# Patient Record
(demographics unavailable — no encounter records)

---

## 2025-01-06 NOTE — HISTORY OF PRESENT ILLNESS
[FreeTextEntry1] : Mr. Kalpesh Harmon presented to the office today for follow-up cardiac evaluation.  He was last evaluated in our office by Dr. Chow on 6/20/2022.  In the interim, he had been following with Dr. Reyez, however, he decided to return to our office for his cardiac care, noting that I also care for his wife.  The patient is a 70-year-old male with a history of coronary artery disease (status post CABG 12/19/2019, including LIMA to LAD and SVG's to D1 and OM1), hypertension, a dyslipidemia, pre-diabetes, mild tricuspid regurgitation, minimal carotid atherosclerosis, anxiety/depression, and prostate carcinoma (status post prostatectomy in his 40's).  The patient has been doing well from a cardiac symptomatic standpoint since his previous visit here on 6/20/2022.  Specifically, he does not report having experienced chest discomfort or dyspnea on exertion in association with his activities.  He has not noted orthopnea, paroxysmal nocturnal dyspnea, or lower extremity edema.  He has not experienced any episodes of palpitations, presyncope or syncope.  As far as risk factors for coronary artery disease are concerned, the patient has a history of hypertension, a dyslipidemia, and pre-diabetes.  He denies a history of cigarette smoking.  He has a reported family history of atherosclerotic heart disease in his mother.  Laboratory studies performed on 11/15/2023 (on Lipitor 40 mg daily) revealed cholesterol 176, triglycerides 110, HDL 51, and calculated .  The liver chemistries were normal.  The BUN and creatinine was 17 and 1.0, respectively.  The potassium level was 5.2.  The glucose level was 117 and the hemoglobin A1c level was 5.9%.  The hemoglobin and hematocrit were 14.5 and 47.1, respectively.  Cardiac catheterization performed in December 2019 (pre-CABG) revealed an 85% left main stenosis, a 65% right coronary artery stenosis, and a 50% stenosis involving the right posterolateral branch vessel.  Left ventriculography revealed moderate segmental left ventricular systolic dysfunction (EF 45%, however, echocardiography performed at that time revealed the EF to be normal).  Exercise stress testing performed on 11/19/2020 revealed the patient to exhibit diminished exercise tolerance (7 METS), without the inducement of cardiac symptoms, electrocardiographic evidence of myocardial ischemia, or significant arrhythmias.  Echocardiography performed on 6/25/2020 revealed normal cardiac chamber sizes with normal left ventricular wall thickness.  Left ventricular systolic function was normal, with a calculated ejection fraction of 64%.  Normal left ventricular diastolic function was demonstrated.  Mild tricuspid regurgitation was demonstrated, without evidence of pulmonary hypertension.  Carotid artery Doppler testing performed on 12/4/2019 revealed minimal bilateral intimal thickening without any significant stenoses demonstrated.  A 24-hour Holter monitor study performed on 5/6/2020 revealed the predominant rhythm throughout the recording to be sinus rhythm at an average rate of 76 bpm, with a range of 44 to 114 bpm.  Rare supraventricular premature contractions and rare ventricular premature contractions were exhibited.

## 2025-01-06 NOTE — PHYSICAL EXAM
[No Acute Distress] : no acute distress [Normal Conjunctiva] : normal conjunctiva [No Carotid Bruit] : no carotid bruit [Normal S1, S2] : normal S1, S2 [No Murmur] : no murmur [No Rub] : no rub [No Gallop] : no gallop [Clear Lung Fields] : clear lung fields [No Respiratory Distress] : no respiratory distress  [Soft] : abdomen soft [Non Tender] : non-tender [Normal Gait] : normal gait [No Edema] : no edema [No Rash] : no rash [Moves all extremities] : moves all extremities [Alert and Oriented] : alert and oriented [de-identified] : Mildly overweight white male [de-identified] : No JVD is appreciated at a 45 degree angle

## 2025-01-06 NOTE — DISCUSSION/SUMMARY
[FreeTextEntry1] : Mr. Harmon is status post CABG on 12/19/2019.  He has been doing well from a cardiac symptomatic standpoint since his previous visit here on 6/20/2022.  Specifically, he does not describe having experienced any signs or symptoms to suggest the development of an anginal syndrome, congestive heart failure, or a hemodynamically-compromising arrhythmia.  His cardiac examination today is unremarkable.  His blood pressure reading today is mildly elevated.  His electrocardiogram today reveals sinus rhythm with left axis deviation, nonspecific diminished voltage in the limb leads, and nonspecific diminished voltage in leads V5-V6, essentially unchanged from his previous office tracing.  I have pointed out to the patient that he is exhibiting a mildly elevated blood pressure reading today.  He states that he is typically normotensive.  Therefore, I have instructed him to continue the present dosage of metoprolol (25 mg half tab twice daily) for the time being, and his blood pressure will be followed.  As he is now more than 5 years status post CABG, I am referring the patient for an updated exercise stress study to more objectively assess the status of his ischemic heart disease.  In addition, echocardiography will be performed to reassess left ventricular function and the degree of tricuspid regurgitation.  The patient is going to make arrangements to have these studies performed through our office, and I will telephone him to discuss the findings, once the studies have been completed.  I have reviewed the findings of the cardiac catheterization of 12/2019, the exercise stress study of 11/19/2020, the echocardiogram of 6/25/2020, the carotid artery Doppler study of 12/4/2019, and the Holter monitor study of 5/6/2020 in detail with the patient today.  A copy of the patient's most recent blood test results has been requested from the office of Dr. Sigala so that I may assess the efficacy of Crestor 40 mg daily in optimizing the patient's lipid profile.  The importance of proper dietary habits, proper weight maintenance, and regular exercise was emphasized to the patient today.  I have asked the patient to call me if he should have any questions or problems pertaining to these matters, especially if he should experience any concerning symptoms.  I have otherwise asked him to return to the office for follow-up cardiac evaluation in 6 months, provided he remains clinically stable in the interim and the findings on the upcoming cardiovascular studies do not warrant sooner evaluation and/or treatment. [EKG obtained to assist in diagnosis and management of assessed problem(s)] : EKG obtained to assist in diagnosis and management of assessed problem(s)

## 2025-01-06 NOTE — CARDIOLOGY SUMMARY
[de-identified] : 1/6/25 -sinus rhythm at a rate of 71 bpm.  Left axis deviation.  Nonspecific diminished voltage in leads V5-V6 and the limb leads.

## 2025-07-07 NOTE — CARDIOLOGY SUMMARY
[de-identified] : 7/7/2025-sinus rhythm at a rate of 67 bpm.  Rightward axis.  Nonspecific diminished voltage in the limb leads and leads V5-V6.

## 2025-07-07 NOTE — PHYSICAL EXAM
[No Acute Distress] : no acute distress [Normal Conjunctiva] : normal conjunctiva [No Carotid Bruit] : no carotid bruit [Normal S1, S2] : normal S1, S2 [No Murmur] : no murmur [No Rub] : no rub [No Gallop] : no gallop [Clear Lung Fields] : clear lung fields [No Respiratory Distress] : no respiratory distress  [Soft] : abdomen soft [Non Tender] : non-tender [Normal Gait] : normal gait [No Edema] : no edema [No Rash] : no rash [Moves all extremities] : moves all extremities [Alert and Oriented] : alert and oriented [de-identified] : Mildly overweight white male [de-identified] : No JVD is appreciated at a 45 degree angle

## 2025-07-07 NOTE — HISTORY OF PRESENT ILLNESS
[FreeTextEntry1] : Mr. Kalpesh Harmon presented to the office today for follow-up cardiac evaluation.  He had been following with Dr. Reyez, however, he decided to return to our office for his cardiac care, noting that I also care for his wife.  I last evaluated the patient in the office on 1/6/2025.  The patient is a 70-year-old male with a history of coronary artery disease (status post CABG 12/19/2019, including LIMA to LAD and SVG's to D1 and OM1), hypertension, a dyslipidemia, pre-diabetes, mild tricuspid regurgitation, minimal carotid atherosclerosis, anxiety/depression, and prostate carcinoma (status post prostatectomy in his 40's).  The patient has been stable from a cardiac symptomatic standpoint since his previous visit here on 1/6/2025.  Specifically, he has noted a similar degree of exertional fatigue in association with performing heavy yard work.  He does not report having experienced chest discomfort or dyspnea on exertion in association with his activities.  He has not noted orthopnea or paroxysmal nocturnal dyspnea.  He has not noted any appreciable change in his usual degree of bilateral lower extremity edema over the past several years.  He has not experienced any episodes of palpitations, presyncope or syncope.  As far as risk factors for coronary artery disease are concerned, the patient has a history of hypertension, a dyslipidemia, and pre-diabetes.  He denies a history of cigarette smoking.  He has a reported family history of atherosclerotic heart disease in his mother.  Laboratory studies performed on 11/15/2023 (on Lipitor 40 mg daily) revealed cholesterol 176, triglycerides 110, HDL 51, and calculated .  The liver chemistries were normal.  The BUN and creatinine was 17 and 1.0, respectively.  The potassium level was 5.2.  The glucose level was 117 and the hemoglobin A1c level was 5.9%.  The hemoglobin and hematocrit were 14.5 and 47.1, respectively.  Cardiac catheterization performed in December 2019 (pre-CABG) revealed an 85% left main stenosis, a 65% right coronary artery stenosis, and a 50% stenosis involving the right posterolateral branch vessel.  Left ventriculography revealed moderate segmental left ventricular systolic dysfunction (EF 45%, however, echocardiography performed at that time revealed the EF to be normal).  Exercise stress testing most recently performed on 1/30/2025 revealed the patient to exhibit satisfactory exercise tolerance without the inducement of cardiac symptoms or electrocardiographic evidence of myocardial ischemia.  1 ventricular premature contraction was exhibited during exercise and occasional ventricular premature contractions were exhibited during the recovery portion of the study.  There was a hypertensive response to exercise (the dosage of Lopressor was subsequently increased from 12.5 mg twice daily to 25 mg twice daily.  Echocardiography most recently performed on 1/30/2025 revealed normal cardiac chamber sizes with normal left ventricular wall thickness.  Left ventricular systolic function was normal, with a calculated ejection fraction of 66%.  Left ventricular diastolic function was indeterminant.  The aortic root was noted to be mildly dilated, measuring 4.1 cm at the level of the sinuses of Valsalva.  The ascending aorta measured 3.7 cm in diameter.  No significant valvular abnormalities were demonstrated.  There was no evidence of pulmonary hypertension.  Carotid artery Doppler testing performed on 12/4/2019 revealed minimal bilateral intimal thickening without any significant stenoses demonstrated.  A 24-hour Holter monitor study performed on 5/6/2020 revealed the predominant rhythm throughout the recording to be sinus rhythm at an average rate of 76 bpm, with a range of 44 to 114 bpm.  Rare supraventricular premature contractions and rare ventricular premature contractions were exhibited.

## 2025-07-07 NOTE — DISCUSSION/SUMMARY
[FreeTextEntry1] : Mr. Harmon is status post CABG on 12/19/2019.  He has been stable from a cardiac symptomatic standpoint since his previous visit here on 1/6/2025.  Specifically, he does not describe having experienced any signs or symptoms to suggest the development of an anginal syndrome, congestive heart failure, or a hemodynamically-compromising arrhythmia.  His cardiac examination today is unremarkable.  His blood pressure reading today is normal.  His electrocardiogram today reveals sinus rhythm with right axis deviation, nonspecific diminished voltage in the limb leads, and nonspecific diminished voltage in leads V5-V6, essentially unchanged from his previous office tracing, with the exception of a rightward shift in the axis on the present tracing.  As his blood pressure reading today in the office is normal, I have instructed the patient to continue metoprolol tartrate 25 mg twice daily for the time being.  I have reviewed the findings of the cardiac catheterization of 12/2019, the exercise stress study of 1/30/2025, the echocardiogram of 1/30/2025, the carotid artery Doppler study of 12/4/2019, and the Holter monitor study of 5/6/2020 in detail with the patient today.  A copy of the patient's most recent blood test results has been requested from the office of Dr. Sigala so that I may assess the efficacy of Crestor 40 mg daily in optimizing the patient's lipid profile.  The importance of proper dietary habits, proper weight maintenance, and regular exercise was emphasized to the patient today.  I have asked the patient to call me if he should have any questions or problems pertaining to these matters, especially if he should experience any concerning symptoms.  I have otherwise asked him to return to the office for follow-up cardiac evaluation in 6 months, provided he remains clinically stable in the interim.  Follow-up echocardiography will be arranged at that time for reassessment of the diameter of the aortic root. [EKG obtained to assist in diagnosis and management of assessed problem(s)] : EKG obtained to assist in diagnosis and management of assessed problem(s)